# Patient Record
(demographics unavailable — no encounter records)

---

## 2025-03-11 NOTE — PHYSICAL EXAM
[General Appearance - Alert] : alert [General Appearance - In No Acute Distress] : in no acute distress [Oriented To Time, Place, And Person] : oriented to person, place, and time [Motor Tone] : muscle tone was normal in all four extremities [Motor Strength] : muscle strength was normal in all four extremities [Involuntary Movements] : no involuntary movements were seen [Sensation Tactile Decrease] : light touch was intact [Balance] : balance was intact [3+] : Ankle jerk left 3+ [No Visual Abnormalities] : no visible abnormalities [Full ROM] : full ROM [Intact] : all sensory within normal limits bilaterally [Hearing Threshold Finger Rub Not [place]] : hearing was normal [Neck Appearance] : the appearance of the neck was normal [] : no respiratory distress [Exaggerated Use Of Accessory Muscles For Inspiration] : no accessory muscle use [Abnormal Walk] : normal gait [Skin Color & Pigmentation] : normal skin color and pigmentation [Ruth] : Ruth's sign was not demonstrated [L'Hermitte's] : neck flexion did not produce tingling down the spine/arms [Spurling's - Opposite Side] : Negative Spurling's on opposite side [Spurling's Same Side] : Negative Spurling's on same side

## 2025-03-11 NOTE — ASSESSMENT
[FreeTextEntry1] : 68 y/o F, with history of neck pain radiating to LUE, numbness in the left hand. She had an MRI cervical spine per her neurologist and comes in today for neurosurgical evaluation. MRI cervical spine in late December shows with DDD changes in the cervical spine as well as stenosis in multiple levels with cord signal. Overall, her symptoms are very mild and no neurological deficit on exam. Even though, her MRI findings are impressive but clinically she is doing great. We would favor non-operative management for now. Will re-evaluate her in 6 months. She will return sooner if worsening symptoms.       Please see Dr. Mcdaniel's dictation for details. I, Dr. Pineda Mcdaniel evaluated the patient with the PA Aries Hendrickson and established the plan of care. I personally discuss this patient with the PA at the time of the visit. I agree with the assessment and plan as written, unless noted below.

## 2025-03-11 NOTE — HISTORY OF PRESENT ILLNESS
[FreeTextEntry1] : neck pain  [de-identified] : 69-year-old right-handed female with PMH breast Ca, HLD, HTN, osteoporosis on Prolia injection, presents for neurosurgical evaluation neck pain and arm pain.  About 2 years ago, developed left arm pain, followed by radiating down the hand and now has numbness in the left hand for the past 9 months. She is having neck pain since January 2025. No issues with balance. No symptoms in the right side. No weakness in upper extremities. She is not losing her . She has not done any conservative therapy including PT or pain management. She remains active and goes to the gym 4 times a week. Denies bladder or bowel dysfunction.

## 2025-03-11 NOTE — PHYSICAL EXAM
[General Appearance - Alert] : alert [General Appearance - In No Acute Distress] : in no acute distress [Oriented To Time, Place, And Person] : oriented to person, place, and time [Motor Tone] : muscle tone was normal in all four extremities [Motor Strength] : muscle strength was normal in all four extremities [Involuntary Movements] : no involuntary movements were seen [Sensation Tactile Decrease] : light touch was intact [Balance] : balance was intact [3+] : Ankle jerk left 3+ [No Visual Abnormalities] : no visible abnormalities [Full ROM] : full ROM [Intact] : all sensory within normal limits bilaterally [Hearing Threshold Finger Rub Not [place]] : hearing was normal [Neck Appearance] : the appearance of the neck was normal [] : no respiratory distress [Exaggerated Use Of Accessory Muscles For Inspiration] : no accessory muscle use [Abnormal Walk] : normal gait [Skin Color & Pigmentation] : normal skin color and pigmentation [Ruth] : Ruth's sign was not demonstrated [L'Hermitte's] : neck flexion did not produce tingling down the spine/arms [Spurling's - Opposite Side] : Negative Spurling's on opposite side [Spurling's Same Side] : Negative Spurling's on same side

## 2025-03-11 NOTE — HISTORY OF PRESENT ILLNESS
[FreeTextEntry1] : neck pain  [de-identified] : 69-year-old right-handed female with PMH breast Ca, HLD, HTN, osteoporosis on Prolia injection, presents for neurosurgical evaluation neck pain and arm pain.  About 2 years ago, developed left arm pain, followed by radiating down the hand and now has numbness in the left hand for the past 9 months. She is having neck pain since January 2025. No issues with balance. No symptoms in the right side. No weakness in upper extremities. She is not losing her . She has not done any conservative therapy including PT or pain management. She remains active and goes to the gym 4 times a week. Denies bladder or bowel dysfunction.

## 2025-03-11 NOTE — ASSESSMENT
[FreeTextEntry1] : 70 y/o F, with history of neck pain radiating to LUE, numbness in the left hand. She had an MRI cervical spine per her neurologist and comes in today for neurosurgical evaluation. MRI cervical spine in late December shows with DDD changes in the cervical spine as well as stenosis in multiple levels with cord signal. Overall, her symptoms are very mild and no neurological deficit on exam. Even though, her MRI findings are impressive but clinically she is doing great. We would favor non-operative management for now. Will re-evaluate her in 6 months. She will return sooner if worsening symptoms.       Please see Dr. Mcdaniel's dictation for details. I, Dr. Pineda Mcdaniel evaluated the patient with the PA Aries Hendrickson and established the plan of care. I personally discuss this patient with the PA at the time of the visit. I agree with the assessment and plan as written, unless noted below.

## 2025-04-22 NOTE — PHYSICAL EXAM
[de-identified] : 1.2 cm right and 1.5 cm left thyroid nodules, well circumscribed and mobile [Laryngoscopy Performed] : laryngoscopy was performed, see procedure section for findings [Midline] : located in midline position [de-identified] : mild asymmetry of uvula [Normal] : orientation to person, place, and time: normal [de-identified] : indirect  laryngoscopy shows normal vocal cord mobility bilaterally with no lesions noted

## 2025-04-22 NOTE — ASSESSMENT
[FreeTextEntry1] : will observe. bloods drawn. to call next week for results. sonogram next visit. RTO 6 months or earlier if any change. patient has been given the opportunity to ask questions, and all of the patient's questions have been answered to their satisfaction

## 2025-04-22 NOTE — CONSULT LETTER
[Dear  ___] : Dear  [unfilled], [Consult Letter:] : I had the pleasure of evaluating your patient, [unfilled]. [Please see my note below.] : Please see my note below. [Consult Closing:] : Thank you very much for allowing me to participate in the care of this patient.  If you have any questions, please do not hesitate to contact me. [Sincerely,] : Sincerely, [FreeTextEntry2] : Dr. Pradeep Davidson, Dr. Bob Gonzáles [FreeTextEntry3] : Landon Hill MD, FACS System Director, Endocrine Surgery [facility] Associate  Professor of Surgery [facility] [facility] [employer] [facility] [DrBarbi  ___] : Dr. VASQUEZ

## 2025-04-22 NOTE — HISTORY OF PRESENT ILLNESS
[de-identified] : Pt c/o Thyroid nodule on recent MRI.  denies dysphagia, hoarseness, SOB or RT exposure sonogram: Right 1.1 cm, 1.6 cm and 8 mm, Left 8 mm and 2.1 cm thyroid nodules FNA ([facility]):  Right AUS, thyroseq negative TSH 2.18, T4 1.27 I have reviewed all old and new data and available images.